# Patient Record
Sex: FEMALE | URBAN - METROPOLITAN AREA
[De-identification: names, ages, dates, MRNs, and addresses within clinical notes are randomized per-mention and may not be internally consistent; named-entity substitution may affect disease eponyms.]

---

## 2019-04-18 ENCOUNTER — APPOINTMENT (RX ONLY)
Dept: URBAN - METROPOLITAN AREA CLINIC 114 | Facility: CLINIC | Age: 54
Setting detail: DERMATOLOGY
End: 2019-04-18

## 2019-04-18 DIAGNOSIS — L40.0 PSORIASIS VULGARIS: ICD-10-CM

## 2019-04-18 DIAGNOSIS — L72.0 EPIDERMAL CYST: ICD-10-CM

## 2019-04-18 PROBLEM — E03.9 HYPOTHYROIDISM, UNSPECIFIED: Status: ACTIVE | Noted: 2019-04-18

## 2019-04-18 PROBLEM — J30.1 ALLERGIC RHINITIS DUE TO POLLEN: Status: ACTIVE | Noted: 2019-04-18

## 2019-04-18 PROBLEM — L29.8 OTHER PRURITUS: Status: ACTIVE | Noted: 2019-04-18

## 2019-04-18 PROCEDURE — 99202 OFFICE O/P NEW SF 15 MIN: CPT

## 2019-04-18 PROCEDURE — ? TREATMENT REGIMEN

## 2019-04-18 PROCEDURE — ? COUNSELING

## 2019-04-18 PROCEDURE — ? RECOMMENDATIONS

## 2019-04-18 PROCEDURE — ? PRESCRIPTION

## 2019-04-18 RX ORDER — CLOBETASOL PROPIONATE 0.5 MG/G
CREAM TOPICAL BID
Qty: 1 | Refills: 2 | Status: ERX | COMMUNITY
Start: 2019-04-18

## 2019-04-18 RX ADMIN — CLOBETASOL PROPIONATE: 0.5 CREAM TOPICAL at 19:10

## 2019-04-18 ASSESSMENT — LOCATION SIMPLE DESCRIPTION DERM
LOCATION SIMPLE: LEFT EYELID
LOCATION SIMPLE: TRAPEZIAL NECK
LOCATION SIMPLE: POSTERIOR NECK

## 2019-04-18 ASSESSMENT — LOCATION ZONE DERM
LOCATION ZONE: NECK
LOCATION ZONE: EYELID

## 2019-04-18 ASSESSMENT — LOCATION DETAILED DESCRIPTION DERM
LOCATION DETAILED: LEFT LATERAL CANTHUS
LOCATION DETAILED: LEFT POSTERIOR NECK
LOCATION DETAILED: MID TRAPEZIAL NECK

## 2019-04-18 ASSESSMENT — PGA PSORIASIS: PGA PSORIASIS: MILD (MILD PLAQUE ELEVATION, LIGHT ERYTHEMA, FINE SCALE PREDOMINATES)

## 2019-04-18 NOTE — PROCEDURE: TREATMENT REGIMEN
Sig For Treatment 1 (If Needed): twice daily
Start Regimen: Clobetasol cream twice daily to rash on neck, no more than 2 weeks out of the month after the first month
Detail Level: Simple
Action 3: Continue
Action 1: Start
Treatment 1: Clobetasol cream

## 2019-04-18 NOTE — PROCEDURE: RECOMMENDATIONS
Recommendation Preamble: The following recommendations were made during the visit:
Detail Level: Simple
Recommendations (Free Text): Discussed hyfrecation if lesion does not go away with baby shampoo and Cetaphil wipes

## 2019-04-18 NOTE — HPI: SKIN LESION
Patient Seen in: [facility] Emergency Department    History   Patient presents with:  Neck Pain (musculoskeletal, neurologic)    Stated Complaint: whiplash    HPI    She is a 27-year-old female who presents emergency department after being involved
(Temporal)   Resp 14   Wt 81.6 kg   SpO2 98%   BMI 30.90 kg/m²         Physical Exam    Constitutional: The patient is oriented to person, place, and time, appears well-developed and well-nourished. HENT:   Head: Normocephalic.    Right Ear: External ear
states that she is aware of having a herniated disc in her neck and will follow-up as directed. Patient will recheck with internal medicine physician in 2 to 3 days time.  They were given instructions in regards to reasons to return to emergency departme
Is This A New Presentation, Or A Follow-Up?: Growth
How Severe Is Your Skin Lesion?: mild
Has Your Skin Lesion Been Treated?: not been treated